# Patient Record
Sex: FEMALE | Race: WHITE | NOT HISPANIC OR LATINO | Employment: OTHER | URBAN - NONMETROPOLITAN AREA
[De-identification: names, ages, dates, MRNs, and addresses within clinical notes are randomized per-mention and may not be internally consistent; named-entity substitution may affect disease eponyms.]

---

## 2018-05-22 ENCOUNTER — HOSPITAL ENCOUNTER (OUTPATIENT)
Facility: HOSPITAL | Age: 68
Discharge: HOME | End: 2018-06-12
Attending: INTERNAL MEDICINE | Admitting: INTERNAL MEDICINE

## 2018-05-22 DIAGNOSIS — Z74.09 IMPAIRED FUNCTIONAL MOBILITY, BALANCE, GAIT, AND ENDURANCE: ICD-10-CM

## 2018-05-22 DIAGNOSIS — Z78.9 IMPAIRED MOBILITY AND ADLS: ICD-10-CM

## 2018-05-22 DIAGNOSIS — Z74.09 IMPAIRED MOBILITY AND ADLS: ICD-10-CM

## 2018-05-22 LAB
ALBUMIN SERPL-MCNC: 2.9 G/DL (ref 3.4–4.8)
ALBUMIN/GLOB SERPL: 0.7 G/DL (ref 1.1–1.8)
ALP SERPL-CCNC: 187 U/L (ref 38–126)
ALT SERPL W P-5'-P-CCNC: 72 U/L (ref 9–52)
ANION GAP SERPL CALCULATED.3IONS-SCNC: 8 MMOL/L (ref 5–15)
ANISOCYTOSIS BLD QL: ABNORMAL
AST SERPL-CCNC: 68 U/L (ref 14–36)
BILIRUB SERPL-MCNC: 0.5 MG/DL (ref 0.2–1.3)
BUN BLD-MCNC: 50 MG/DL (ref 7–21)
BUN/CREAT SERPL: 27.3 (ref 7–25)
CALCIUM SPEC-SCNC: 8.6 MG/DL (ref 8.4–10.2)
CHLORIDE SERPL-SCNC: 90 MMOL/L (ref 95–110)
CLUMPED PLATELETS: ABNORMAL
CO2 SERPL-SCNC: 37 MMOL/L (ref 22–31)
CREAT BLD-MCNC: 1.83 MG/DL (ref 0.5–1)
DEPRECATED RDW RBC AUTO: 55.8 FL (ref 36.4–46.3)
ERYTHROCYTE [DISTWIDTH] IN BLOOD BY AUTOMATED COUNT: 15.2 % (ref 11.5–14.5)
GFR SERPL CREATININE-BSD FRML MDRD: 27 ML/MIN/1.73 (ref 45–104)
GLOBULIN UR ELPH-MCNC: 4 GM/DL (ref 2.3–3.5)
GLUCOSE BLD-MCNC: 144 MG/DL (ref 60–100)
HCT VFR BLD AUTO: 31.7 % (ref 35–45)
HGB BLD-MCNC: 10.2 G/DL (ref 12–15.5)
HOLD SPECIMEN: NORMAL
HYPOCHROMIA BLD QL: ABNORMAL
LYMPHOCYTES # BLD MANUAL: 0.65 10*3/MM3 (ref 0.6–4.2)
LYMPHOCYTES NFR BLD MANUAL: 8 % (ref 0–12)
LYMPHOCYTES NFR BLD MANUAL: 8 % (ref 10–50)
MAGNESIUM SERPL-MCNC: 2.5 MG/DL (ref 1.6–2.3)
MCH RBC QN AUTO: 32.1 PG (ref 26.5–34)
MCHC RBC AUTO-ENTMCNC: 32.2 G/DL (ref 31.4–36)
MCV RBC AUTO: 99.7 FL (ref 80–98)
MONOCYTES # BLD AUTO: 0.65 10*3/MM3 (ref 0–0.9)
MYELOCYTES NFR BLD MANUAL: 2 % (ref 0–0)
NEUTROPHILS # BLD AUTO: 6.67 10*3/MM3 (ref 2–8.6)
NEUTROPHILS NFR BLD MANUAL: 78 % (ref 37–80)
NEUTS BAND NFR BLD MANUAL: 4 % (ref 0–5)
PLATELET # BLD AUTO: 53 10*3/MM3 (ref 150–450)
PMV BLD AUTO: 14.2 FL (ref 8–12)
POTASSIUM BLD-SCNC: 3.8 MMOL/L (ref 3.5–5.1)
PROT SERPL-MCNC: 6.9 G/DL (ref 6.3–8.6)
RBC # BLD AUTO: 3.18 10*6/MM3 (ref 3.77–5.16)
SMALL PLATELETS BLD QL SMEAR: ABNORMAL
SODIUM BLD-SCNC: 135 MMOL/L (ref 137–145)
WBC MORPH BLD: NORMAL
WBC NRBC COR # BLD: 8.13 10*3/MM3 (ref 3.2–9.8)
WHOLE BLOOD HOLD SPECIMEN: NORMAL

## 2018-05-22 PROCEDURE — 87040 BLOOD CULTURE FOR BACTERIA: CPT | Performed by: INTERNAL MEDICINE

## 2018-05-22 PROCEDURE — 25010000002 METHYLPREDNISOLONE PER 125 MG: Performed by: INTERNAL MEDICINE

## 2018-05-22 PROCEDURE — 80053 COMPREHEN METABOLIC PANEL: CPT | Performed by: INTERNAL MEDICINE

## 2018-05-22 PROCEDURE — 83735 ASSAY OF MAGNESIUM: CPT | Performed by: INTERNAL MEDICINE

## 2018-05-22 PROCEDURE — 85025 COMPLETE CBC W/AUTO DIFF WBC: CPT | Performed by: INTERNAL MEDICINE

## 2018-05-22 PROCEDURE — 87181 SC STD AGAR DILUTION PER AGT: CPT | Performed by: INTERNAL MEDICINE

## 2018-05-22 PROCEDURE — 85007 BL SMEAR W/DIFF WBC COUNT: CPT | Performed by: INTERNAL MEDICINE

## 2018-05-22 PROCEDURE — 87205 SMEAR GRAM STAIN: CPT | Performed by: INTERNAL MEDICINE

## 2018-05-22 PROCEDURE — 87070 CULTURE OTHR SPECIMN AEROBIC: CPT | Performed by: INTERNAL MEDICINE

## 2018-05-22 RX ORDER — IPRATROPIUM BROMIDE AND ALBUTEROL SULFATE 2.5; .5 MG/3ML; MG/3ML
3 SOLUTION RESPIRATORY (INHALATION)
Status: DISCONTINUED | OUTPATIENT
Start: 2018-05-22 | End: 2018-05-22

## 2018-05-22 RX ORDER — FUROSEMIDE 20 MG/1
20 TABLET ORAL DAILY
Status: DISCONTINUED | OUTPATIENT
Start: 2018-05-23 | End: 2018-06-09

## 2018-05-22 RX ORDER — DOCUSATE SODIUM 100 MG/1
200 CAPSULE, LIQUID FILLED ORAL 2 TIMES DAILY
Status: DISCONTINUED | OUTPATIENT
Start: 2018-05-22 | End: 2018-06-13 | Stop reason: HOSPADM

## 2018-05-22 RX ORDER — BUDESONIDE 0.5 MG/2ML
0.5 INHALANT ORAL
Status: DISCONTINUED | OUTPATIENT
Start: 2018-05-22 | End: 2018-06-13 | Stop reason: HOSPADM

## 2018-05-22 RX ORDER — ONDANSETRON 2 MG/ML
4 INJECTION INTRAMUSCULAR; INTRAVENOUS EVERY 6 HOURS PRN
Status: DISCONTINUED | OUTPATIENT
Start: 2018-05-22 | End: 2018-06-13 | Stop reason: HOSPADM

## 2018-05-22 RX ORDER — LEVOTHYROXINE SODIUM 0.1 MG/1
100 TABLET ORAL
Status: DISCONTINUED | OUTPATIENT
Start: 2018-05-23 | End: 2018-06-13 | Stop reason: HOSPADM

## 2018-05-22 RX ORDER — HYDROCODONE BITARTRATE AND ACETAMINOPHEN 7.5; 325 MG/1; MG/1
1 TABLET ORAL EVERY 6 HOURS PRN
Status: DISPENSED | OUTPATIENT
Start: 2018-05-22 | End: 2018-06-01

## 2018-05-22 RX ORDER — ALBUTEROL SULFATE 2.5 MG/3ML
2.5 SOLUTION RESPIRATORY (INHALATION)
Status: DISCONTINUED | OUTPATIENT
Start: 2018-05-22 | End: 2018-06-13 | Stop reason: HOSPADM

## 2018-05-22 RX ORDER — IPRATROPIUM BROMIDE AND ALBUTEROL SULFATE 2.5; .5 MG/3ML; MG/3ML
3 SOLUTION RESPIRATORY (INHALATION)
Status: DISCONTINUED | OUTPATIENT
Start: 2018-05-22 | End: 2018-06-13 | Stop reason: HOSPADM

## 2018-05-22 RX ORDER — CARVEDILOL 6.25 MG/1
6.25 TABLET ORAL 2 TIMES DAILY WITH MEALS
Status: DISCONTINUED | OUTPATIENT
Start: 2018-05-22 | End: 2018-06-13 | Stop reason: HOSPADM

## 2018-05-22 RX ORDER — FAMOTIDINE 20 MG/1
20 TABLET, FILM COATED ORAL 2 TIMES DAILY
Status: DISCONTINUED | OUTPATIENT
Start: 2018-05-22 | End: 2018-06-13 | Stop reason: HOSPADM

## 2018-05-22 RX ORDER — FUROSEMIDE 20 MG/1
20 TABLET ORAL DAILY
Status: DISCONTINUED | OUTPATIENT
Start: 2018-05-22 | End: 2018-05-22

## 2018-05-22 RX ORDER — SPIRONOLACTONE 25 MG/1
25 TABLET ORAL DAILY
Status: DISCONTINUED | OUTPATIENT
Start: 2018-05-23 | End: 2018-06-13 | Stop reason: HOSPADM

## 2018-05-22 RX ORDER — METHYLPREDNISOLONE SODIUM SUCCINATE 125 MG/2ML
60 INJECTION, POWDER, LYOPHILIZED, FOR SOLUTION INTRAMUSCULAR; INTRAVENOUS EVERY 6 HOURS
Status: DISCONTINUED | OUTPATIENT
Start: 2018-05-22 | End: 2018-05-26

## 2018-05-22 RX ORDER — HYDRALAZINE HYDROCHLORIDE 20 MG/ML
10 INJECTION INTRAMUSCULAR; INTRAVENOUS EVERY 4 HOURS PRN
Status: DISCONTINUED | OUTPATIENT
Start: 2018-05-22 | End: 2018-06-13 | Stop reason: HOSPADM

## 2018-05-23 LAB
ARTERIAL PATENCY WRIST A: ABNORMAL
ATMOSPHERIC PRESS: 749 MMHG
BASE EXCESS BLDA CALC-SCNC: 10.2 MMOL/L (ref 0–2)
BDY SITE: ABNORMAL
EPAP: 7
HCO3 BLDA-SCNC: 38.2 MMOL/L (ref 20–26)
HOROWITZ INDEX BLD+IHG-RTO: 30 %
IPAP: 14
Lab: ABNORMAL
MODALITY: ABNORMAL
PAW @ PEAK INSP FLOW SETTING VENT: 14 CMH2O
PCO2 BLDA: 62.7 MM HG (ref 35–45)
PH BLDA: 7.39 PH UNITS (ref 7.35–7.45)
PO2 BLDA: 78.5 MM HG (ref 83–108)
SAO2 % BLDCOA: 95.7 % (ref 94–99)
SET MECH RESP RATE: 18
VENTILATOR MODE: ABNORMAL

## 2018-05-23 PROCEDURE — 97162 PT EVAL MOD COMPLEX 30 MIN: CPT

## 2018-05-23 PROCEDURE — 97116 GAIT TRAINING THERAPY: CPT

## 2018-05-23 PROCEDURE — 97535 SELF CARE MNGMENT TRAINING: CPT

## 2018-05-23 PROCEDURE — 82803 BLOOD GASES ANY COMBINATION: CPT

## 2018-05-23 PROCEDURE — 36600 WITHDRAWAL OF ARTERIAL BLOOD: CPT

## 2018-05-23 PROCEDURE — 25010000002 METHYLPREDNISOLONE PER 125 MG: Performed by: INTERNAL MEDICINE

## 2018-05-23 PROCEDURE — 97166 OT EVAL MOD COMPLEX 45 MIN: CPT

## 2018-05-24 LAB
ALBUMIN SERPL-MCNC: 2.8 G/DL (ref 3.4–4.8)
ALBUMIN/GLOB SERPL: 0.7 G/DL (ref 1.1–1.8)
ALP SERPL-CCNC: 186 U/L (ref 38–126)
ALT SERPL W P-5'-P-CCNC: 67 U/L (ref 9–52)
ANION GAP SERPL CALCULATED.3IONS-SCNC: 7 MMOL/L (ref 5–15)
AST SERPL-CCNC: 54 U/L (ref 14–36)
BILIRUB SERPL-MCNC: 0.7 MG/DL (ref 0.2–1.3)
BUN BLD-MCNC: 61 MG/DL (ref 7–21)
BUN/CREAT SERPL: 37.4 (ref 7–25)
CALCIUM SPEC-SCNC: 8.6 MG/DL (ref 8.4–10.2)
CHLORIDE SERPL-SCNC: 94 MMOL/L (ref 95–110)
CO2 SERPL-SCNC: 37 MMOL/L (ref 22–31)
CREAT BLD-MCNC: 1.63 MG/DL (ref 0.5–1)
DEPRECATED RDW RBC AUTO: 52 FL (ref 36.4–46.3)
ERYTHROCYTE [DISTWIDTH] IN BLOOD BY AUTOMATED COUNT: 15.1 % (ref 11.5–14.5)
GFR SERPL CREATININE-BSD FRML MDRD: 31 ML/MIN/1.73 (ref 45–104)
GLOBULIN UR ELPH-MCNC: 3.9 GM/DL (ref 2.3–3.5)
GLUCOSE BLD-MCNC: 136 MG/DL (ref 60–100)
HCT VFR BLD AUTO: 30.9 % (ref 35–45)
HGB BLD-MCNC: 10.3 G/DL (ref 12–15.5)
MAGNESIUM SERPL-MCNC: 2.6 MG/DL (ref 1.6–2.3)
MCH RBC QN AUTO: 31.4 PG (ref 26.5–34)
MCHC RBC AUTO-ENTMCNC: 33.3 G/DL (ref 31.4–36)
MCV RBC AUTO: 94.2 FL (ref 80–98)
PLATELET # BLD AUTO: 65 10*3/MM3 (ref 150–450)
PMV BLD AUTO: 13.8 FL (ref 8–12)
POTASSIUM BLD-SCNC: 3.7 MMOL/L (ref 3.5–5.1)
PROT SERPL-MCNC: 6.7 G/DL (ref 6.3–8.6)
RBC # BLD AUTO: 3.28 10*6/MM3 (ref 3.77–5.16)
SODIUM BLD-SCNC: 138 MMOL/L (ref 137–145)
WBC NRBC COR # BLD: 8.97 10*3/MM3 (ref 3.2–9.8)

## 2018-05-24 PROCEDURE — 25010000002 HEPARIN LOCK FLUSH 10 UNIT/ML SOLUTION: Performed by: INTERNAL MEDICINE

## 2018-05-24 PROCEDURE — 83735 ASSAY OF MAGNESIUM: CPT | Performed by: INTERNAL MEDICINE

## 2018-05-24 PROCEDURE — 97110 THERAPEUTIC EXERCISES: CPT

## 2018-05-24 PROCEDURE — 97530 THERAPEUTIC ACTIVITIES: CPT

## 2018-05-24 PROCEDURE — 25010000002 METHYLPREDNISOLONE PER 125 MG: Performed by: INTERNAL MEDICINE

## 2018-05-24 PROCEDURE — 97116 GAIT TRAINING THERAPY: CPT

## 2018-05-24 PROCEDURE — 85027 COMPLETE CBC AUTOMATED: CPT | Performed by: INTERNAL MEDICINE

## 2018-05-24 PROCEDURE — 80053 COMPREHEN METABOLIC PANEL: CPT | Performed by: INTERNAL MEDICINE

## 2018-05-24 RX ORDER — SODIUM CHLORIDE 0.9 % (FLUSH) 0.9 %
10 SYRINGE (ML) INJECTION EVERY 8 HOURS SCHEDULED
Status: DISCONTINUED | OUTPATIENT
Start: 2018-05-24 | End: 2018-06-13 | Stop reason: HOSPADM

## 2018-05-24 RX ORDER — HEPARIN SODIUM,PORCINE 10 UNIT/ML
30 VIAL (ML) INTRAVENOUS EVERY 8 HOURS SCHEDULED
Status: DISCONTINUED | OUTPATIENT
Start: 2018-05-24 | End: 2018-06-13 | Stop reason: HOSPADM

## 2018-05-24 RX ORDER — SODIUM CHLORIDE 0.9 % (FLUSH) 0.9 %
10 SYRINGE (ML) INJECTION AS NEEDED
Status: DISCONTINUED | OUTPATIENT
Start: 2018-05-24 | End: 2018-06-13 | Stop reason: HOSPADM

## 2018-05-25 LAB
BACTERIA SPEC RESP CULT: ABNORMAL
BACTERIA SPEC RESP CULT: ABNORMAL
GRAM STN SPEC: ABNORMAL

## 2018-05-25 PROCEDURE — 97530 THERAPEUTIC ACTIVITIES: CPT

## 2018-05-25 PROCEDURE — 25010000002 HEPARIN LOCK FLUSH 10 UNIT/ML SOLUTION: Performed by: INTERNAL MEDICINE

## 2018-05-25 PROCEDURE — 97116 GAIT TRAINING THERAPY: CPT

## 2018-05-25 PROCEDURE — 93005 ELECTROCARDIOGRAM TRACING: CPT | Performed by: INTERNAL MEDICINE

## 2018-05-25 PROCEDURE — 25010000002 CEFTRIAXONE: Performed by: INTERNAL MEDICINE

## 2018-05-25 PROCEDURE — 97535 SELF CARE MNGMENT TRAINING: CPT

## 2018-05-25 PROCEDURE — 63710000001 PREDNISONE PER 1 MG: Performed by: INTERNAL MEDICINE

## 2018-05-25 PROCEDURE — 93010 ELECTROCARDIOGRAM REPORT: CPT | Performed by: INTERNAL MEDICINE

## 2018-05-25 PROCEDURE — 25010000002 METHYLPREDNISOLONE PER 125 MG: Performed by: INTERNAL MEDICINE

## 2018-05-25 RX ORDER — AMIODARONE HYDROCHLORIDE 200 MG/1
200 TABLET ORAL 2 TIMES DAILY
Status: DISCONTINUED | OUTPATIENT
Start: 2018-05-26 | End: 2018-06-13 | Stop reason: HOSPADM

## 2018-05-25 RX ORDER — PREDNISONE 20 MG/1
60 TABLET ORAL DAILY
Status: DISCONTINUED | OUTPATIENT
Start: 2018-05-27 | End: 2018-06-04

## 2018-05-25 RX ORDER — PREDNISONE 20 MG/1
60 TABLET ORAL 2 TIMES DAILY WITH MEALS
Status: DISPENSED | OUTPATIENT
Start: 2018-05-25 | End: 2018-05-27

## 2018-05-25 RX ORDER — SODIUM CHLORIDE 9 MG/ML
INJECTION, SOLUTION INTRAVENOUS
Status: DISPENSED
Start: 2018-05-25 | End: 2018-05-26

## 2018-05-26 LAB
ALBUMIN SERPL-MCNC: 2.9 G/DL (ref 3.4–4.8)
ALBUMIN/GLOB SERPL: 0.7 G/DL (ref 1.1–1.8)
ALP SERPL-CCNC: 174 U/L (ref 38–126)
ALT SERPL W P-5'-P-CCNC: 74 U/L (ref 9–52)
ANION GAP SERPL CALCULATED.3IONS-SCNC: 8 MMOL/L (ref 5–15)
AST SERPL-CCNC: 63 U/L (ref 14–36)
BILIRUB SERPL-MCNC: 0.7 MG/DL (ref 0.2–1.3)
BUN BLD-MCNC: 75 MG/DL (ref 7–21)
BUN/CREAT SERPL: 43.1 (ref 7–25)
CALCIUM SPEC-SCNC: 8.4 MG/DL (ref 8.4–10.2)
CHLORIDE SERPL-SCNC: 91 MMOL/L (ref 95–110)
CO2 SERPL-SCNC: 38 MMOL/L (ref 22–31)
CREAT BLD-MCNC: 1.74 MG/DL (ref 0.5–1)
DEPRECATED RDW RBC AUTO: 53.6 FL (ref 36.4–46.3)
ERYTHROCYTE [DISTWIDTH] IN BLOOD BY AUTOMATED COUNT: 15.6 % (ref 11.5–14.5)
GFR SERPL CREATININE-BSD FRML MDRD: 29 ML/MIN/1.73 (ref 45–104)
GLOBULIN UR ELPH-MCNC: 3.9 GM/DL (ref 2.3–3.5)
GLUCOSE BLD-MCNC: 121 MG/DL (ref 60–100)
HCT VFR BLD AUTO: 32.6 % (ref 35–45)
HGB BLD-MCNC: 10.9 G/DL (ref 12–15.5)
MAGNESIUM SERPL-MCNC: 2.7 MG/DL (ref 1.6–2.3)
MCH RBC QN AUTO: 31.5 PG (ref 26.5–34)
MCHC RBC AUTO-ENTMCNC: 33.4 G/DL (ref 31.4–36)
MCV RBC AUTO: 94.2 FL (ref 80–98)
PLATELET # BLD AUTO: 62 10*3/MM3 (ref 150–450)
PMV BLD AUTO: 12.9 FL (ref 8–12)
POTASSIUM BLD-SCNC: 4.1 MMOL/L (ref 3.5–5.1)
PROT SERPL-MCNC: 6.8 G/DL (ref 6.3–8.6)
RBC # BLD AUTO: 3.46 10*6/MM3 (ref 3.77–5.16)
SODIUM BLD-SCNC: 137 MMOL/L (ref 137–145)
WBC NRBC COR # BLD: 11.8 10*3/MM3 (ref 3.2–9.8)

## 2018-05-26 PROCEDURE — 80053 COMPREHEN METABOLIC PANEL: CPT | Performed by: INTERNAL MEDICINE

## 2018-05-26 PROCEDURE — 25010000002 CEFTRIAXONE: Performed by: INTERNAL MEDICINE

## 2018-05-26 PROCEDURE — 25010000002 HEPARIN LOCK FLUSH 10 UNIT/ML SOLUTION: Performed by: INTERNAL MEDICINE

## 2018-05-26 PROCEDURE — 85027 COMPLETE CBC AUTOMATED: CPT | Performed by: INTERNAL MEDICINE

## 2018-05-26 PROCEDURE — 83735 ASSAY OF MAGNESIUM: CPT | Performed by: INTERNAL MEDICINE

## 2018-05-26 PROCEDURE — 63710000001 PREDNISONE PER 1 MG: Performed by: INTERNAL MEDICINE

## 2018-05-26 PROCEDURE — 25010000002 METHYLPREDNISOLONE PER 125 MG: Performed by: INTERNAL MEDICINE

## 2018-05-26 RX ORDER — SODIUM CHLORIDE 9 MG/ML
INJECTION, SOLUTION INTRAVENOUS
Status: DISPENSED
Start: 2018-05-26 | End: 2018-05-27

## 2018-05-27 LAB
BACTERIA SPEC AEROBE CULT: NORMAL
BACTERIA SPEC AEROBE CULT: NORMAL

## 2018-05-27 PROCEDURE — 97116 GAIT TRAINING THERAPY: CPT

## 2018-05-27 PROCEDURE — 97535 SELF CARE MNGMENT TRAINING: CPT

## 2018-05-27 PROCEDURE — 97110 THERAPEUTIC EXERCISES: CPT

## 2018-05-27 PROCEDURE — 25010000002 HEPARIN LOCK FLUSH 10 UNIT/ML SOLUTION: Performed by: INTERNAL MEDICINE

## 2018-05-27 PROCEDURE — 63710000001 PREDNISONE PER 1 MG: Performed by: INTERNAL MEDICINE

## 2018-05-27 PROCEDURE — 97530 THERAPEUTIC ACTIVITIES: CPT

## 2018-05-27 PROCEDURE — 25010000002 CEFTRIAXONE: Performed by: INTERNAL MEDICINE

## 2018-05-27 RX ORDER — SODIUM CHLORIDE 9 MG/ML
INJECTION, SOLUTION INTRAVENOUS
Status: DISPENSED
Start: 2018-05-27 | End: 2018-05-28

## 2018-05-28 LAB
ALBUMIN SERPL-MCNC: 2.7 G/DL (ref 3.4–4.8)
ALBUMIN/GLOB SERPL: 0.7 G/DL (ref 1.1–1.8)
ALP SERPL-CCNC: 158 U/L (ref 38–126)
ALT SERPL W P-5'-P-CCNC: 74 U/L (ref 9–52)
ANION GAP SERPL CALCULATED.3IONS-SCNC: 7 MMOL/L (ref 5–15)
AST SERPL-CCNC: 64 U/L (ref 14–36)
BILIRUB SERPL-MCNC: 0.7 MG/DL (ref 0.2–1.3)
BUN BLD-MCNC: 80 MG/DL (ref 7–21)
BUN/CREAT SERPL: 50 (ref 7–25)
CALCIUM SPEC-SCNC: 8.4 MG/DL (ref 8.4–10.2)
CHLORIDE SERPL-SCNC: 95 MMOL/L (ref 95–110)
CO2 SERPL-SCNC: 37 MMOL/L (ref 22–31)
CREAT BLD-MCNC: 1.6 MG/DL (ref 0.5–1)
DEPRECATED RDW RBC AUTO: 54 FL (ref 36.4–46.3)
ERYTHROCYTE [DISTWIDTH] IN BLOOD BY AUTOMATED COUNT: 15.7 % (ref 11.5–14.5)
GFR SERPL CREATININE-BSD FRML MDRD: 32 ML/MIN/1.73 (ref 45–104)
GLOBULIN UR ELPH-MCNC: 3.8 GM/DL (ref 2.3–3.5)
GLUCOSE BLD-MCNC: 144 MG/DL (ref 60–100)
HCT VFR BLD AUTO: 31.4 % (ref 35–45)
HGB BLD-MCNC: 10.5 G/DL (ref 12–15.5)
MAGNESIUM SERPL-MCNC: 2.6 MG/DL (ref 1.6–2.3)
MCH RBC QN AUTO: 31.4 PG (ref 26.5–34)
MCHC RBC AUTO-ENTMCNC: 33.4 G/DL (ref 31.4–36)
MCV RBC AUTO: 94 FL (ref 80–98)
PLATELET # BLD AUTO: 50 10*3/MM3 (ref 150–450)
PMV BLD AUTO: 13.6 FL (ref 8–12)
POTASSIUM BLD-SCNC: 4.7 MMOL/L (ref 3.5–5.1)
PROT SERPL-MCNC: 6.5 G/DL (ref 6.3–8.6)
RBC # BLD AUTO: 3.34 10*6/MM3 (ref 3.77–5.16)
SODIUM BLD-SCNC: 139 MMOL/L (ref 137–145)
WBC NRBC COR # BLD: 9.89 10*3/MM3 (ref 3.2–9.8)

## 2018-05-28 PROCEDURE — 25010000002 HYDRALAZINE PER 20 MG: Performed by: INTERNAL MEDICINE

## 2018-05-28 PROCEDURE — 63710000001 PREDNISONE PER 1 MG: Performed by: INTERNAL MEDICINE

## 2018-05-28 PROCEDURE — 83735 ASSAY OF MAGNESIUM: CPT | Performed by: INTERNAL MEDICINE

## 2018-05-28 PROCEDURE — 85027 COMPLETE CBC AUTOMATED: CPT | Performed by: INTERNAL MEDICINE

## 2018-05-28 PROCEDURE — 80053 COMPREHEN METABOLIC PANEL: CPT | Performed by: INTERNAL MEDICINE

## 2018-05-28 PROCEDURE — 25010000002 HEPARIN LOCK FLUSH 10 UNIT/ML SOLUTION: Performed by: INTERNAL MEDICINE

## 2018-05-28 PROCEDURE — 25010000002 CEFTRIAXONE: Performed by: INTERNAL MEDICINE

## 2018-05-29 PROCEDURE — 97116 GAIT TRAINING THERAPY: CPT

## 2018-05-29 PROCEDURE — 63710000001 PREDNISONE PER 1 MG: Performed by: INTERNAL MEDICINE

## 2018-05-29 PROCEDURE — 97530 THERAPEUTIC ACTIVITIES: CPT

## 2018-05-29 PROCEDURE — 25010000002 HEPARIN LOCK FLUSH 10 UNIT/ML SOLUTION: Performed by: INTERNAL MEDICINE

## 2018-05-29 PROCEDURE — 25010000002 CEFTRIAXONE: Performed by: INTERNAL MEDICINE

## 2018-05-29 PROCEDURE — 97110 THERAPEUTIC EXERCISES: CPT

## 2018-05-30 LAB
ALBUMIN SERPL-MCNC: 2.6 G/DL (ref 3.4–4.8)
ALBUMIN/GLOB SERPL: 0.7 G/DL (ref 1.1–1.8)
ALP SERPL-CCNC: 178 U/L (ref 38–126)
ALT SERPL W P-5'-P-CCNC: 84 U/L (ref 9–52)
ANION GAP SERPL CALCULATED.3IONS-SCNC: 4 MMOL/L (ref 5–15)
AST SERPL-CCNC: 77 U/L (ref 14–36)
BILIRUB SERPL-MCNC: 0.7 MG/DL (ref 0.2–1.3)
BUN BLD-MCNC: 70 MG/DL (ref 7–21)
BUN/CREAT SERPL: 44.9 (ref 7–25)
CALCIUM SPEC-SCNC: 8.2 MG/DL (ref 8.4–10.2)
CHLORIDE SERPL-SCNC: 98 MMOL/L (ref 95–110)
CO2 SERPL-SCNC: 36 MMOL/L (ref 22–31)
CREAT BLD-MCNC: 1.56 MG/DL (ref 0.5–1)
DEPRECATED RDW RBC AUTO: 54.3 FL (ref 36.4–46.3)
ERYTHROCYTE [DISTWIDTH] IN BLOOD BY AUTOMATED COUNT: 15.6 % (ref 11.5–14.5)
GFR SERPL CREATININE-BSD FRML MDRD: 33 ML/MIN/1.73 (ref 45–104)
GLOBULIN UR ELPH-MCNC: 3.9 GM/DL (ref 2.3–3.5)
GLUCOSE BLD-MCNC: 133 MG/DL (ref 60–100)
HCT VFR BLD AUTO: 31.7 % (ref 35–45)
HGB BLD-MCNC: 10.5 G/DL (ref 12–15.5)
MAGNESIUM SERPL-MCNC: 2.5 MG/DL (ref 1.6–2.3)
MCH RBC QN AUTO: 31.4 PG (ref 26.5–34)
MCHC RBC AUTO-ENTMCNC: 33.1 G/DL (ref 31.4–36)
MCV RBC AUTO: 94.9 FL (ref 80–98)
PLATELET # BLD AUTO: 48 10*3/MM3 (ref 150–450)
PMV BLD AUTO: 13.8 FL (ref 8–12)
POTASSIUM BLD-SCNC: 4.6 MMOL/L (ref 3.5–5.1)
PROT SERPL-MCNC: 6.5 G/DL (ref 6.3–8.6)
RBC # BLD AUTO: 3.34 10*6/MM3 (ref 3.77–5.16)
RBC MORPH BLD: NORMAL
SMALL PLATELETS BLD QL SMEAR: NORMAL
SODIUM BLD-SCNC: 138 MMOL/L (ref 137–145)
WBC MORPH BLD: NORMAL
WBC NRBC COR # BLD: 10.33 10*3/MM3 (ref 3.2–9.8)

## 2018-05-30 PROCEDURE — 25010000002 CEFTRIAXONE: Performed by: INTERNAL MEDICINE

## 2018-05-30 PROCEDURE — 85027 COMPLETE CBC AUTOMATED: CPT | Performed by: INTERNAL MEDICINE

## 2018-05-30 PROCEDURE — 97535 SELF CARE MNGMENT TRAINING: CPT

## 2018-05-30 PROCEDURE — 25010000002 HEPARIN LOCK FLUSH 10 UNIT/ML SOLUTION: Performed by: INTERNAL MEDICINE

## 2018-05-30 PROCEDURE — 80053 COMPREHEN METABOLIC PANEL: CPT | Performed by: INTERNAL MEDICINE

## 2018-05-30 PROCEDURE — 97530 THERAPEUTIC ACTIVITIES: CPT

## 2018-05-30 PROCEDURE — 83735 ASSAY OF MAGNESIUM: CPT | Performed by: INTERNAL MEDICINE

## 2018-05-30 PROCEDURE — 85007 BL SMEAR W/DIFF WBC COUNT: CPT | Performed by: INTERNAL MEDICINE

## 2018-05-30 PROCEDURE — 63710000001 PREDNISONE PER 1 MG: Performed by: INTERNAL MEDICINE

## 2018-05-30 PROCEDURE — 97116 GAIT TRAINING THERAPY: CPT

## 2018-05-30 RX ORDER — SODIUM CHLORIDE 9 MG/ML
INJECTION, SOLUTION INTRAVENOUS
Status: DISPENSED
Start: 2018-05-30 | End: 2018-05-31

## 2018-05-31 PROCEDURE — 63710000001 PREDNISONE PER 1 MG: Performed by: INTERNAL MEDICINE

## 2018-05-31 PROCEDURE — 97116 GAIT TRAINING THERAPY: CPT

## 2018-05-31 PROCEDURE — 25010000002 CEFTRIAXONE: Performed by: INTERNAL MEDICINE

## 2018-05-31 PROCEDURE — 97535 SELF CARE MNGMENT TRAINING: CPT

## 2018-05-31 PROCEDURE — 97110 THERAPEUTIC EXERCISES: CPT

## 2018-05-31 PROCEDURE — 25010000002 HEPARIN LOCK FLUSH 10 UNIT/ML SOLUTION: Performed by: INTERNAL MEDICINE

## 2018-06-01 LAB
ALBUMIN SERPL-MCNC: 2.6 G/DL (ref 3.4–4.8)
ALBUMIN/GLOB SERPL: 0.7 G/DL (ref 1.1–1.8)
ALP SERPL-CCNC: 176 U/L (ref 38–126)
ALT SERPL W P-5'-P-CCNC: 79 U/L (ref 9–52)
ANION GAP SERPL CALCULATED.3IONS-SCNC: 5 MMOL/L (ref 5–15)
AST SERPL-CCNC: 68 U/L (ref 14–36)
BILIRUB SERPL-MCNC: 0.9 MG/DL (ref 0.2–1.3)
BUN BLD-MCNC: 74 MG/DL (ref 7–21)
BUN/CREAT SERPL: 50.7 (ref 7–25)
CALCIUM SPEC-SCNC: 8.2 MG/DL (ref 8.4–10.2)
CHLORIDE SERPL-SCNC: 97 MMOL/L (ref 95–110)
CO2 SERPL-SCNC: 35 MMOL/L (ref 22–31)
CREAT BLD-MCNC: 1.46 MG/DL (ref 0.5–1)
DEPRECATED RDW RBC AUTO: 54.2 FL (ref 36.4–46.3)
ERYTHROCYTE [DISTWIDTH] IN BLOOD BY AUTOMATED COUNT: 15.7 % (ref 11.5–14.5)
GFR SERPL CREATININE-BSD FRML MDRD: 36 ML/MIN/1.73 (ref 45–104)
GLOBULIN UR ELPH-MCNC: 3.5 GM/DL (ref 2.3–3.5)
GLUCOSE BLD-MCNC: 108 MG/DL (ref 60–100)
HCT VFR BLD AUTO: 30.6 % (ref 35–45)
HGB BLD-MCNC: 10.1 G/DL (ref 12–15.5)
MAGNESIUM SERPL-MCNC: 2.5 MG/DL (ref 1.6–2.3)
MCH RBC QN AUTO: 31.2 PG (ref 26.5–34)
MCHC RBC AUTO-ENTMCNC: 33 G/DL (ref 31.4–36)
MCV RBC AUTO: 94.4 FL (ref 80–98)
PLATELET # BLD AUTO: 45 10*3/MM3 (ref 150–450)
PMV BLD AUTO: 13.7 FL (ref 8–12)
POTASSIUM BLD-SCNC: 4.8 MMOL/L (ref 3.5–5.1)
PROT SERPL-MCNC: 6.1 G/DL (ref 6.3–8.6)
RBC # BLD AUTO: 3.24 10*6/MM3 (ref 3.77–5.16)
SODIUM BLD-SCNC: 137 MMOL/L (ref 137–145)
WBC NRBC COR # BLD: 9.26 10*3/MM3 (ref 3.2–9.8)

## 2018-06-01 PROCEDURE — 97116 GAIT TRAINING THERAPY: CPT

## 2018-06-01 PROCEDURE — 83735 ASSAY OF MAGNESIUM: CPT | Performed by: INTERNAL MEDICINE

## 2018-06-01 PROCEDURE — 25010000002 HEPARIN LOCK FLUSH 10 UNIT/ML SOLUTION: Performed by: INTERNAL MEDICINE

## 2018-06-01 PROCEDURE — 63710000001 PREDNISONE PER 1 MG: Performed by: INTERNAL MEDICINE

## 2018-06-01 PROCEDURE — 97535 SELF CARE MNGMENT TRAINING: CPT

## 2018-06-01 PROCEDURE — 85027 COMPLETE CBC AUTOMATED: CPT | Performed by: INTERNAL MEDICINE

## 2018-06-01 PROCEDURE — 80053 COMPREHEN METABOLIC PANEL: CPT | Performed by: INTERNAL MEDICINE

## 2018-06-02 PROCEDURE — 25010000002 HEPARIN LOCK FLUSH 10 UNIT/ML SOLUTION: Performed by: INTERNAL MEDICINE

## 2018-06-02 PROCEDURE — 63710000001 PREDNISONE PER 1 MG: Performed by: INTERNAL MEDICINE

## 2018-06-03 LAB
ALBUMIN SERPL-MCNC: 2.8 G/DL (ref 3.4–4.8)
ALBUMIN/GLOB SERPL: 0.7 G/DL (ref 1.1–1.8)
ALP SERPL-CCNC: 204 U/L (ref 38–126)
ALT SERPL W P-5'-P-CCNC: 105 U/L (ref 9–52)
ANION GAP SERPL CALCULATED.3IONS-SCNC: 5 MMOL/L (ref 5–15)
AST SERPL-CCNC: 95 U/L (ref 14–36)
BILIRUB SERPL-MCNC: 1 MG/DL (ref 0.2–1.3)
BUN BLD-MCNC: 72 MG/DL (ref 7–21)
BUN/CREAT SERPL: 46.8 (ref 7–25)
CALCIUM SPEC-SCNC: 8.7 MG/DL (ref 8.4–10.2)
CHLORIDE SERPL-SCNC: 98 MMOL/L (ref 95–110)
CO2 SERPL-SCNC: 33 MMOL/L (ref 22–31)
CREAT BLD-MCNC: 1.54 MG/DL (ref 0.5–1)
DEPRECATED RDW RBC AUTO: 54.5 FL (ref 36.4–46.3)
ERYTHROCYTE [DISTWIDTH] IN BLOOD BY AUTOMATED COUNT: 15.8 % (ref 11.5–14.5)
GFR SERPL CREATININE-BSD FRML MDRD: 33 ML/MIN/1.73 (ref 45–104)
GLOBULIN UR ELPH-MCNC: 3.8 GM/DL (ref 2.3–3.5)
GLUCOSE BLD-MCNC: 141 MG/DL (ref 60–100)
HCT VFR BLD AUTO: 33 % (ref 35–45)
HGB BLD-MCNC: 10.9 G/DL (ref 12–15.5)
MAGNESIUM SERPL-MCNC: 2.5 MG/DL (ref 1.6–2.3)
MCH RBC QN AUTO: 31.6 PG (ref 26.5–34)
MCHC RBC AUTO-ENTMCNC: 33 G/DL (ref 31.4–36)
MCV RBC AUTO: 95.7 FL (ref 80–98)
PLATELET # BLD AUTO: 49 10*3/MM3 (ref 150–450)
PMV BLD AUTO: 13.4 FL (ref 8–12)
POTASSIUM BLD-SCNC: 5.4 MMOL/L (ref 3.5–5.1)
PROT SERPL-MCNC: 6.6 G/DL (ref 6.3–8.6)
RBC # BLD AUTO: 3.45 10*6/MM3 (ref 3.77–5.16)
SODIUM BLD-SCNC: 136 MMOL/L (ref 137–145)
WBC NRBC COR # BLD: 14.49 10*3/MM3 (ref 3.2–9.8)

## 2018-06-03 PROCEDURE — 93010 ELECTROCARDIOGRAM REPORT: CPT | Performed by: INTERNAL MEDICINE

## 2018-06-03 PROCEDURE — 83735 ASSAY OF MAGNESIUM: CPT | Performed by: INTERNAL MEDICINE

## 2018-06-03 PROCEDURE — 93005 ELECTROCARDIOGRAM TRACING: CPT | Performed by: INTERNAL MEDICINE

## 2018-06-03 PROCEDURE — 85027 COMPLETE CBC AUTOMATED: CPT | Performed by: INTERNAL MEDICINE

## 2018-06-03 PROCEDURE — 25010000002 HEPARIN LOCK FLUSH 10 UNIT/ML SOLUTION: Performed by: INTERNAL MEDICINE

## 2018-06-03 PROCEDURE — 63710000001 PREDNISONE PER 1 MG: Performed by: INTERNAL MEDICINE

## 2018-06-03 PROCEDURE — 80053 COMPREHEN METABOLIC PANEL: CPT | Performed by: INTERNAL MEDICINE

## 2018-06-03 RX ORDER — SODIUM POLYSTYRENE SULFONATE 15 G/60ML
30 SUSPENSION ORAL; RECTAL ONCE
Status: DISCONTINUED | OUTPATIENT
Start: 2018-06-03 | End: 2018-06-08

## 2018-06-04 ENCOUNTER — OUTSIDE FACILITY SERVICE (OUTPATIENT)
Dept: ONCOLOGY | Facility: CLINIC | Age: 68
End: 2018-06-04

## 2018-06-04 LAB
ALBUMIN SERPL-MCNC: 2.8 G/DL (ref 3.4–4.8)
ALBUMIN/GLOB SERPL: 0.8 G/DL (ref 1.1–1.8)
ALP SERPL-CCNC: 200 U/L (ref 38–126)
ALT SERPL W P-5'-P-CCNC: 108 U/L (ref 9–52)
ANION GAP SERPL CALCULATED.3IONS-SCNC: 4 MMOL/L (ref 5–15)
AST SERPL-CCNC: 82 U/L (ref 14–36)
BASOPHILS # BLD AUTO: 0.01 10*3/MM3 (ref 0–0.2)
BASOPHILS NFR BLD AUTO: 0.1 % (ref 0–2)
BILIRUB SERPL-MCNC: 0.9 MG/DL (ref 0.2–1.3)
BUN BLD-MCNC: 66 MG/DL (ref 7–21)
BUN/CREAT SERPL: 43.7 (ref 7–25)
CALCIUM SPEC-SCNC: 8.4 MG/DL (ref 8.4–10.2)
CHLORIDE SERPL-SCNC: 97 MMOL/L (ref 95–110)
CO2 SERPL-SCNC: 34 MMOL/L (ref 22–31)
CREAT BLD-MCNC: 1.51 MG/DL (ref 0.5–1)
DEPRECATED RDW RBC AUTO: 54.4 FL (ref 36.4–46.3)
EOSINOPHIL # BLD AUTO: 0 10*3/MM3 (ref 0–0.7)
EOSINOPHIL NFR BLD AUTO: 0 % (ref 0–7)
ERYTHROCYTE [DISTWIDTH] IN BLOOD BY AUTOMATED COUNT: 15.8 % (ref 11.5–14.5)
GFR SERPL CREATININE-BSD FRML MDRD: 34 ML/MIN/1.73 (ref 45–104)
GLOBULIN UR ELPH-MCNC: 3.6 GM/DL (ref 2.3–3.5)
GLUCOSE BLD-MCNC: 131 MG/DL (ref 60–100)
HCT VFR BLD AUTO: 32.2 % (ref 35–45)
HGB BLD-MCNC: 10.7 G/DL (ref 12–15.5)
IMM GRANULOCYTES # BLD: 0.14 10*3/MM3 (ref 0–0.02)
IMM GRANULOCYTES NFR BLD: 1.1 % (ref 0–0.5)
LYMPHOCYTES # BLD AUTO: 0.46 10*3/MM3 (ref 0.6–4.2)
LYMPHOCYTES NFR BLD AUTO: 3.6 % (ref 10–50)
MAGNESIUM SERPL-MCNC: 2.4 MG/DL (ref 1.6–2.3)
MCH RBC QN AUTO: 31.8 PG (ref 26.5–34)
MCHC RBC AUTO-ENTMCNC: 33.2 G/DL (ref 31.4–36)
MCV RBC AUTO: 95.8 FL (ref 80–98)
MONOCYTES # BLD AUTO: 0.73 10*3/MM3 (ref 0–0.9)
MONOCYTES NFR BLD AUTO: 5.7 % (ref 0–12)
NEUTROPHILS # BLD AUTO: 11.56 10*3/MM3 (ref 2–8.6)
NEUTROPHILS NFR BLD AUTO: 89.5 % (ref 37–80)
PLATELET # BLD AUTO: 32 10*3/MM3 (ref 150–450)
PMV BLD AUTO: 13.9 FL (ref 8–12)
POTASSIUM BLD-SCNC: 4.9 MMOL/L (ref 3.5–5.1)
PROT SERPL-MCNC: 6.4 G/DL (ref 6.3–8.6)
RBC # BLD AUTO: 3.36 10*6/MM3 (ref 3.77–5.16)
SODIUM BLD-SCNC: 135 MMOL/L (ref 137–145)
WBC NRBC COR # BLD: 12.9 10*3/MM3 (ref 3.2–9.8)

## 2018-06-04 PROCEDURE — 80053 COMPREHEN METABOLIC PANEL: CPT | Performed by: INTERNAL MEDICINE

## 2018-06-04 PROCEDURE — 63710000001 PREDNISONE PER 1 MG: Performed by: INTERNAL MEDICINE

## 2018-06-04 PROCEDURE — 99222 1ST HOSP IP/OBS MODERATE 55: CPT | Performed by: INTERNAL MEDICINE

## 2018-06-04 PROCEDURE — 85025 COMPLETE CBC W/AUTO DIFF WBC: CPT | Performed by: INTERNAL MEDICINE

## 2018-06-04 PROCEDURE — 83735 ASSAY OF MAGNESIUM: CPT | Performed by: INTERNAL MEDICINE

## 2018-06-04 RX ORDER — PREDNISONE 20 MG/1
40 TABLET ORAL DAILY
Status: DISCONTINUED | OUTPATIENT
Start: 2018-06-05 | End: 2018-06-13 | Stop reason: HOSPADM

## 2018-06-04 NOTE — SIGNIFICANT NOTE
06/04/18 1139   Rehab Treatment   Discipline occupational therapy assistant   Reason Treatment Not Performed other (see comments)  (Pt platelets 32)

## 2018-06-04 NOTE — SIGNIFICANT NOTE
06/04/18 0958   Rehab Treatment   Discipline physical therapy assistant   Reason Treatment Not Performed unable to treat, medical status change  (pt. platelets are 32 this am and treatment deferred)

## 2018-06-05 ENCOUNTER — OUTSIDE FACILITY SERVICE (OUTPATIENT)
Dept: ONCOLOGY | Facility: CLINIC | Age: 68
End: 2018-06-05

## 2018-06-05 ENCOUNTER — APPOINTMENT (OUTPATIENT)
Dept: ULTRASOUND IMAGING | Facility: HOSPITAL | Age: 68
End: 2018-06-05

## 2018-06-05 LAB
25(OH)D3 SERPL-MCNC: 21.8 NG/ML (ref 30–100)
ALBUMIN SERPL-MCNC: 2.8 G/DL (ref 3.4–4.8)
ALBUMIN/GLOB SERPL: 0.8 G/DL (ref 1.1–1.8)
ALP SERPL-CCNC: 216 U/L (ref 38–126)
ALT SERPL W P-5'-P-CCNC: 109 U/L (ref 9–52)
ANION GAP SERPL CALCULATED.3IONS-SCNC: 5 MMOL/L (ref 5–15)
AST SERPL-CCNC: 75 U/L (ref 14–36)
BASOPHILS # BLD AUTO: 0.01 10*3/MM3 (ref 0–0.2)
BASOPHILS NFR BLD AUTO: 0.1 % (ref 0–2)
BILIRUB SERPL-MCNC: 1.1 MG/DL (ref 0.2–1.3)
BUN BLD-MCNC: 66 MG/DL (ref 7–21)
BUN/CREAT SERPL: 40.5 (ref 7–25)
CALCIUM SPEC-SCNC: 8.3 MG/DL (ref 8.4–10.2)
CHLORIDE SERPL-SCNC: 97 MMOL/L (ref 95–110)
CO2 SERPL-SCNC: 34 MMOL/L (ref 22–31)
CREAT BLD-MCNC: 1.63 MG/DL (ref 0.5–1)
DEPRECATED RDW RBC AUTO: 55.1 FL (ref 36.4–46.3)
DEPRECATED RDW RBC AUTO: 57.4 FL (ref 36.4–46.3)
EOSINOPHIL # BLD AUTO: 0 10*3/MM3 (ref 0–0.7)
EOSINOPHIL NFR BLD AUTO: 0 % (ref 0–7)
ERYTHROCYTE [DISTWIDTH] IN BLOOD BY AUTOMATED COUNT: 16 % (ref 11.5–14.5)
ERYTHROCYTE [DISTWIDTH] IN BLOOD BY AUTOMATED COUNT: 16.4 % (ref 11.5–14.5)
FERRITIN SERPL-MCNC: 48.7 NG/ML (ref 11.1–264)
FOLATE SERPL-MCNC: 7.95 NG/ML (ref 2.76–21)
GFR SERPL CREATININE-BSD FRML MDRD: 31 ML/MIN/1.73 (ref 45–104)
GLOBULIN UR ELPH-MCNC: 3.7 GM/DL (ref 2.3–3.5)
GLUCOSE BLD-MCNC: 144 MG/DL (ref 60–100)
HCT VFR BLD AUTO: 33.3 % (ref 35–45)
HCT VFR BLD AUTO: 33.3 % (ref 35–45)
HGB BLD-MCNC: 10.8 G/DL (ref 12–15.5)
HGB BLD-MCNC: 11 G/DL (ref 12–15.5)
IMM GRANULOCYTES # BLD: 0.15 10*3/MM3 (ref 0–0.02)
IMM GRANULOCYTES NFR BLD: 1.2 % (ref 0–0.5)
IRON 24H UR-MRATE: 58 MCG/DL (ref 37–170)
IRON SATN MFR SERPL: 18 % (ref 15–50)
LYMPHOCYTES # BLD AUTO: 0.39 10*3/MM3 (ref 0.6–4.2)
LYMPHOCYTES NFR BLD AUTO: 3.2 % (ref 10–50)
MAGNESIUM SERPL-MCNC: 2.4 MG/DL (ref 1.6–2.3)
MCH RBC QN AUTO: 30.9 PG (ref 26.5–34)
MCH RBC QN AUTO: 32 PG (ref 26.5–34)
MCHC RBC AUTO-ENTMCNC: 32.4 G/DL (ref 31.4–36)
MCHC RBC AUTO-ENTMCNC: 33 G/DL (ref 31.4–36)
MCV RBC AUTO: 95.4 FL (ref 80–98)
MCV RBC AUTO: 96.8 FL (ref 80–98)
MONOCYTES # BLD AUTO: 0.53 10*3/MM3 (ref 0–0.9)
MONOCYTES NFR BLD AUTO: 4.3 % (ref 0–12)
NEUTROPHILS # BLD AUTO: 11.14 10*3/MM3 (ref 2–8.6)
NEUTROPHILS NFR BLD AUTO: 91.2 % (ref 37–80)
NRBC BLD MANUAL-RTO: 0 /100 WBC (ref 0–0)
PLATELET # BLD AUTO: 32 10*3/MM3 (ref 150–450)
PLATELET # BLD AUTO: 33 10*3/MM3 (ref 150–450)
PMV BLD AUTO: 12.7 FL (ref 8–12)
PMV BLD AUTO: 13 FL (ref 8–12)
POTASSIUM BLD-SCNC: 5 MMOL/L (ref 3.5–5.1)
PROT SERPL-MCNC: 6.5 G/DL (ref 6.3–8.6)
RBC # BLD AUTO: 3.44 10*6/MM3 (ref 3.77–5.16)
RBC # BLD AUTO: 3.49 10*6/MM3 (ref 3.77–5.16)
SODIUM BLD-SCNC: 136 MMOL/L (ref 137–145)
TIBC SERPL-MCNC: 325 MCG/DL (ref 265–497)
VIT B12 BLD-MCNC: >1000 PG/ML (ref 239–931)
WBC NRBC COR # BLD: 11.79 10*3/MM3 (ref 3.2–9.8)
WBC NRBC COR # BLD: 12.22 10*3/MM3 (ref 3.2–9.8)

## 2018-06-05 PROCEDURE — 83550 IRON BINDING TEST: CPT | Performed by: INTERNAL MEDICINE

## 2018-06-05 PROCEDURE — 99233 SBSQ HOSP IP/OBS HIGH 50: CPT | Performed by: INTERNAL MEDICINE

## 2018-06-05 PROCEDURE — 82306 VITAMIN D 25 HYDROXY: CPT | Performed by: NURSE PRACTITIONER

## 2018-06-05 PROCEDURE — 86706 HEP B SURFACE ANTIBODY: CPT | Performed by: INTERNAL MEDICINE

## 2018-06-05 PROCEDURE — 63710000001 PREDNISONE PER 1 MG: Performed by: NURSE PRACTITIONER

## 2018-06-05 PROCEDURE — 87350 HEPATITIS BE AG IA: CPT | Performed by: INTERNAL MEDICINE

## 2018-06-05 PROCEDURE — 85027 COMPLETE CBC AUTOMATED: CPT | Performed by: INTERNAL MEDICINE

## 2018-06-05 PROCEDURE — 85025 COMPLETE CBC W/AUTO DIFF WBC: CPT | Performed by: INTERNAL MEDICINE

## 2018-06-05 PROCEDURE — 86704 HEP B CORE ANTIBODY TOTAL: CPT | Performed by: INTERNAL MEDICINE

## 2018-06-05 PROCEDURE — 83540 ASSAY OF IRON: CPT | Performed by: INTERNAL MEDICINE

## 2018-06-05 PROCEDURE — 82746 ASSAY OF FOLIC ACID SERUM: CPT | Performed by: INTERNAL MEDICINE

## 2018-06-05 PROCEDURE — 86707 HEPATITIS BE ANTIBODY: CPT | Performed by: INTERNAL MEDICINE

## 2018-06-05 PROCEDURE — 83735 ASSAY OF MAGNESIUM: CPT | Performed by: INTERNAL MEDICINE

## 2018-06-05 PROCEDURE — 76700 US EXAM ABDOM COMPLETE: CPT

## 2018-06-05 PROCEDURE — 82607 VITAMIN B-12: CPT | Performed by: INTERNAL MEDICINE

## 2018-06-05 PROCEDURE — 80053 COMPREHEN METABOLIC PANEL: CPT | Performed by: INTERNAL MEDICINE

## 2018-06-05 PROCEDURE — 80074 ACUTE HEPATITIS PANEL: CPT | Performed by: INTERNAL MEDICINE

## 2018-06-05 PROCEDURE — 82728 ASSAY OF FERRITIN: CPT | Performed by: INTERNAL MEDICINE

## 2018-06-05 RX ORDER — FOLIC ACID 1 MG/1
1 TABLET ORAL DAILY
Status: DISCONTINUED | OUTPATIENT
Start: 2018-06-05 | End: 2018-06-13 | Stop reason: HOSPADM

## 2018-06-06 ENCOUNTER — OUTSIDE FACILITY SERVICE (OUTPATIENT)
Dept: ONCOLOGY | Facility: CLINIC | Age: 68
End: 2018-06-06

## 2018-06-06 LAB
DEPRECATED RDW RBC AUTO: 58.6 FL (ref 36.4–46.3)
ERYTHROCYTE [DISTWIDTH] IN BLOOD BY AUTOMATED COUNT: 16.5 % (ref 11.5–14.5)
HBV CORE AB SER DONR QL IA: NEGATIVE
HBV CORE IGM SERPL QL IA: NEGATIVE
HBV E AB SERPL QL IA: NEGATIVE
HBV E AG SERPL QL IA: NEGATIVE
HBV SURFACE AB SER QL: NON REACTIVE
HBV SURFACE AG SERPL QL IA: NEGATIVE
HCT VFR BLD AUTO: 33.6 % (ref 35–45)
HCV AB S/CO SERPL IA: <0.1 S/CO RATIO (ref 0–0.9)
HGB BLD-MCNC: 11.1 G/DL (ref 12–15.5)
HOLD SPECIMEN: NORMAL
LABORATORY COMMENT REPORT: NORMAL
MCH RBC QN AUTO: 32.2 PG (ref 26.5–34)
MCHC RBC AUTO-ENTMCNC: 33 G/DL (ref 31.4–36)
MCV RBC AUTO: 97.4 FL (ref 80–98)
PLATELET # BLD AUTO: 28 10*3/MM3 (ref 150–450)
PMV BLD AUTO: 12.7 FL (ref 8–12)
RBC # BLD AUTO: 3.45 10*6/MM3 (ref 3.77–5.16)
WBC NRBC COR # BLD: 12.8 10*3/MM3 (ref 3.2–9.8)

## 2018-06-06 PROCEDURE — 99233 SBSQ HOSP IP/OBS HIGH 50: CPT | Performed by: INTERNAL MEDICINE

## 2018-06-06 PROCEDURE — 25010000002 HEPARIN LOCK FLUSH 10 UNIT/ML SOLUTION: Performed by: INTERNAL MEDICINE

## 2018-06-06 PROCEDURE — 85027 COMPLETE CBC AUTOMATED: CPT | Performed by: INTERNAL MEDICINE

## 2018-06-06 PROCEDURE — 63710000001 PREDNISONE PER 1 MG: Performed by: NURSE PRACTITIONER

## 2018-06-07 ENCOUNTER — OUTSIDE FACILITY SERVICE (OUTPATIENT)
Dept: ONCOLOGY | Facility: CLINIC | Age: 68
End: 2018-06-07

## 2018-06-07 LAB
ALBUMIN SERPL-MCNC: 2.8 G/DL (ref 3.4–4.8)
ALBUMIN/GLOB SERPL: 0.7 G/DL (ref 1.1–1.8)
ALP SERPL-CCNC: 229 U/L (ref 38–126)
ALT SERPL W P-5'-P-CCNC: 113 U/L (ref 9–52)
ANION GAP SERPL CALCULATED.3IONS-SCNC: 6 MMOL/L (ref 5–15)
AST SERPL-CCNC: 80 U/L (ref 14–36)
BASOPHILS # BLD AUTO: 0.01 10*3/MM3 (ref 0–0.2)
BASOPHILS NFR BLD AUTO: 0.1 % (ref 0–2)
BILIRUB SERPL-MCNC: 1 MG/DL (ref 0.2–1.3)
BUN BLD-MCNC: 72 MG/DL (ref 7–21)
BUN/CREAT SERPL: 44.7 (ref 7–25)
CALCIUM SPEC-SCNC: 8.4 MG/DL (ref 8.4–10.2)
CHLORIDE SERPL-SCNC: 99 MMOL/L (ref 95–110)
CO2 SERPL-SCNC: 31 MMOL/L (ref 22–31)
CREAT BLD-MCNC: 1.61 MG/DL (ref 0.5–1)
CRP SERPL-MCNC: 1.7 MG/DL (ref 0–1)
DEPRECATED RDW RBC AUTO: 56 FL (ref 36.4–46.3)
EOSINOPHIL # BLD AUTO: 0 10*3/MM3 (ref 0–0.7)
EOSINOPHIL NFR BLD AUTO: 0 % (ref 0–7)
ERYTHROCYTE [DISTWIDTH] IN BLOOD BY AUTOMATED COUNT: 16.2 % (ref 11.5–14.5)
ERYTHROCYTE [SEDIMENTATION RATE] IN BLOOD: 46 MM/HR (ref 0–20)
FOLATE SERPL-MCNC: 13.2 NG/ML (ref 2.76–21)
GFR SERPL CREATININE-BSD FRML MDRD: 32 ML/MIN/1.73 (ref 45–104)
GLOBULIN UR ELPH-MCNC: 3.8 GM/DL (ref 2.3–3.5)
GLUCOSE BLD-MCNC: 139 MG/DL (ref 60–100)
HCT VFR BLD AUTO: 33.6 % (ref 35–45)
HGB BLD-MCNC: 11 G/DL (ref 12–15.5)
IMM GRANULOCYTES # BLD: 0.16 10*3/MM3 (ref 0–0.02)
IMM GRANULOCYTES NFR BLD: 1.3 % (ref 0–0.5)
LYMPHOCYTES # BLD AUTO: 0.35 10*3/MM3 (ref 0.6–4.2)
LYMPHOCYTES NFR BLD AUTO: 2.8 % (ref 10–50)
MAGNESIUM SERPL-MCNC: 2.6 MG/DL (ref 1.6–2.3)
MCH RBC QN AUTO: 31.3 PG (ref 26.5–34)
MCHC RBC AUTO-ENTMCNC: 32.7 G/DL (ref 31.4–36)
MCV RBC AUTO: 95.5 FL (ref 80–98)
MONOCYTES # BLD AUTO: 0.73 10*3/MM3 (ref 0–0.9)
MONOCYTES NFR BLD AUTO: 5.8 % (ref 0–12)
NEUTROPHILS # BLD AUTO: 11.29 10*3/MM3 (ref 2–8.6)
NEUTROPHILS NFR BLD AUTO: 90 % (ref 37–80)
PLAT MORPH BLD: NORMAL
PLATELET # BLD AUTO: 28 10*3/MM3 (ref 150–450)
PMV BLD AUTO: ABNORMAL FL (ref 8–12)
POTASSIUM BLD-SCNC: 4.6 MMOL/L (ref 3.5–5.1)
PROT SERPL-MCNC: 6.6 G/DL (ref 6.3–8.6)
RBC # BLD AUTO: 3.52 10*6/MM3 (ref 3.77–5.16)
RBC MORPH BLD: NORMAL
SODIUM BLD-SCNC: 136 MMOL/L (ref 137–145)
VIT B12 BLD-MCNC: >1000 PG/ML (ref 239–931)
WBC MORPH BLD: NORMAL
WBC NRBC COR # BLD: 12.54 10*3/MM3 (ref 3.2–9.8)

## 2018-06-07 PROCEDURE — 25010000002 HEPARIN LOCK FLUSH 10 UNIT/ML SOLUTION: Performed by: INTERNAL MEDICINE

## 2018-06-07 PROCEDURE — 80053 COMPREHEN METABOLIC PANEL: CPT | Performed by: INTERNAL MEDICINE

## 2018-06-07 PROCEDURE — 83735 ASSAY OF MAGNESIUM: CPT | Performed by: INTERNAL MEDICINE

## 2018-06-07 PROCEDURE — 85025 COMPLETE CBC W/AUTO DIFF WBC: CPT | Performed by: INTERNAL MEDICINE

## 2018-06-07 PROCEDURE — 85651 RBC SED RATE NONAUTOMATED: CPT | Performed by: INTERNAL MEDICINE

## 2018-06-07 PROCEDURE — 85007 BL SMEAR W/DIFF WBC COUNT: CPT | Performed by: INTERNAL MEDICINE

## 2018-06-07 PROCEDURE — 82607 VITAMIN B-12: CPT | Performed by: INTERNAL MEDICINE

## 2018-06-07 PROCEDURE — 99233 SBSQ HOSP IP/OBS HIGH 50: CPT | Performed by: INTERNAL MEDICINE

## 2018-06-07 PROCEDURE — 63710000001 PREDNISONE PER 1 MG: Performed by: NURSE PRACTITIONER

## 2018-06-07 PROCEDURE — 82746 ASSAY OF FOLIC ACID SERUM: CPT | Performed by: INTERNAL MEDICINE

## 2018-06-07 PROCEDURE — 86140 C-REACTIVE PROTEIN: CPT | Performed by: INTERNAL MEDICINE

## 2018-06-07 NOTE — SIGNIFICANT NOTE
06/07/18 1610   Rehab Time/Intention   Evaluation Not Performed patient/family decline, not feeling well  (Platlets still low- recheck 6.8.18)   Rehab Treatment   Discipline physical therapist

## 2018-06-08 ENCOUNTER — OUTSIDE FACILITY SERVICE (OUTPATIENT)
Dept: ONCOLOGY | Facility: CLINIC | Age: 68
End: 2018-06-08

## 2018-06-08 LAB
ABO GROUP BLD: NORMAL
ANISOCYTOSIS BLD QL: NORMAL
BASOPHILS # BLD AUTO: 0.01 10*3/MM3 (ref 0–0.2)
BASOPHILS NFR BLD AUTO: 0.1 % (ref 0–2)
BLD GP AB SCN SERPL QL: NEGATIVE
CLUMPED PLATELETS: NORMAL
DEPRECATED RDW RBC AUTO: 55.6 FL (ref 36.4–46.3)
DEPRECATED RDW RBC AUTO: 55.6 FL (ref 36.4–46.3)
EOSINOPHIL # BLD AUTO: 0 10*3/MM3 (ref 0–0.7)
EOSINOPHIL NFR BLD AUTO: 0 % (ref 0–7)
ERYTHROCYTE [DISTWIDTH] IN BLOOD BY AUTOMATED COUNT: 15.9 % (ref 11.5–14.5)
ERYTHROCYTE [DISTWIDTH] IN BLOOD BY AUTOMATED COUNT: 16 % (ref 11.5–14.5)
HAV IGM SERPL QL IA: NEGATIVE
HBV CORE IGM SERPL QL IA: NEGATIVE
HBV SURFACE AG SERPL QL IA: NEGATIVE
HCT VFR BLD AUTO: 30.9 % (ref 35–45)
HCT VFR BLD AUTO: 31.1 % (ref 35–45)
HCV AB SER DONR QL: NEGATIVE
HGB BLD-MCNC: 10.2 G/DL (ref 12–15.5)
HGB BLD-MCNC: 10.5 G/DL (ref 12–15.5)
HOLD SPECIMEN: NORMAL
IMM GRANULOCYTES # BLD: 0.18 10*3/MM3 (ref 0–0.02)
IMM GRANULOCYTES NFR BLD: 1.5 % (ref 0–0.5)
LYMPHOCYTES # BLD AUTO: 0.23 10*3/MM3 (ref 0.6–4.2)
LYMPHOCYTES # BLD MANUAL: 0.6 10*3/MM3 (ref 0.6–4.2)
LYMPHOCYTES NFR BLD AUTO: 1.9 % (ref 10–50)
LYMPHOCYTES NFR BLD MANUAL: 5 % (ref 0–12)
LYMPHOCYTES NFR BLD MANUAL: 6 % (ref 10–50)
Lab: NORMAL
MCH RBC QN AUTO: 31.2 PG (ref 26.5–34)
MCH RBC QN AUTO: 32.1 PG (ref 26.5–34)
MCHC RBC AUTO-ENTMCNC: 33 G/DL (ref 31.4–36)
MCHC RBC AUTO-ENTMCNC: 33.8 G/DL (ref 31.4–36)
MCV RBC AUTO: 94.5 FL (ref 80–98)
MCV RBC AUTO: 95.1 FL (ref 80–98)
METAMYELOCYTES NFR BLD MANUAL: 1 % (ref 0–0)
MONOCYTES # BLD AUTO: 0.5 10*3/MM3 (ref 0–0.9)
MONOCYTES # BLD AUTO: 0.54 10*3/MM3 (ref 0–0.9)
MONOCYTES NFR BLD AUTO: 4.4 % (ref 0–12)
NEUTROPHILS # BLD AUTO: 11.39 10*3/MM3 (ref 2–8.6)
NEUTROPHILS # BLD AUTO: 8.85 10*3/MM3 (ref 2–8.6)
NEUTROPHILS NFR BLD AUTO: 92.1 % (ref 37–80)
NEUTROPHILS NFR BLD MANUAL: 88 % (ref 37–80)
NRBC BLD MANUAL-RTO: 0 /100 WBC (ref 0–0)
PLATELET # BLD AUTO: 21 10*3/MM3 (ref 150–450)
PLATELET # BLD AUTO: 49 10*3/MM3 (ref 150–450)
PMV BLD AUTO: 12.1 FL (ref 8–12)
PMV BLD AUTO: ABNORMAL FL (ref 8–12)
RBC # BLD AUTO: 3.27 10*6/MM3 (ref 3.77–5.16)
RBC # BLD AUTO: 3.27 10*6/MM3 (ref 3.77–5.16)
RBC MORPH BLD: NORMAL
RH BLD: POSITIVE
SMALL PLATELETS BLD QL SMEAR: ABNORMAL
SMALL PLATELETS BLD QL SMEAR: NORMAL
T&S EXPIRATION DATE: NORMAL
WBC MORPH BLD: NORMAL
WBC MORPH BLD: NORMAL
WBC NRBC COR # BLD: 10.06 10*3/MM3 (ref 3.2–9.8)
WBC NRBC COR # BLD: 12.35 10*3/MM3 (ref 3.2–9.8)

## 2018-06-08 PROCEDURE — 86900 BLOOD TYPING SEROLOGIC ABO: CPT

## 2018-06-08 PROCEDURE — 86850 RBC ANTIBODY SCREEN: CPT | Performed by: NURSE PRACTITIONER

## 2018-06-08 PROCEDURE — 99233 SBSQ HOSP IP/OBS HIGH 50: CPT | Performed by: INTERNAL MEDICINE

## 2018-06-08 PROCEDURE — 85007 BL SMEAR W/DIFF WBC COUNT: CPT | Performed by: INTERNAL MEDICINE

## 2018-06-08 PROCEDURE — 86901 BLOOD TYPING SEROLOGIC RH(D): CPT

## 2018-06-08 PROCEDURE — 80074 ACUTE HEPATITIS PANEL: CPT | Performed by: INTERNAL MEDICINE

## 2018-06-08 PROCEDURE — 63710000001 PREDNISONE PER 1 MG: Performed by: NURSE PRACTITIONER

## 2018-06-08 PROCEDURE — 85025 COMPLETE CBC W/AUTO DIFF WBC: CPT | Performed by: INTERNAL MEDICINE

## 2018-06-08 PROCEDURE — 86901 BLOOD TYPING SEROLOGIC RH(D): CPT | Performed by: NURSE PRACTITIONER

## 2018-06-08 PROCEDURE — P9019 PLATELETS, EACH UNIT: HCPCS

## 2018-06-08 PROCEDURE — 86900 BLOOD TYPING SEROLOGIC ABO: CPT | Performed by: NURSE PRACTITIONER

## 2018-06-08 PROCEDURE — 25010000002 HEPARIN LOCK FLUSH 10 UNIT/ML SOLUTION: Performed by: INTERNAL MEDICINE

## 2018-06-08 NOTE — SIGNIFICANT NOTE
06/08/18 1143   Rehab Time/Intention   Evaluation Not Performed other (see comments)  (platelets low; provider aware)   Rehab Treatment   Discipline physical therapist

## 2018-06-09 ENCOUNTER — OUTSIDE FACILITY SERVICE (OUTPATIENT)
Dept: ONCOLOGY | Facility: CLINIC | Age: 68
End: 2018-06-09

## 2018-06-09 LAB
ABO + RH BLD: NORMAL
ABO + RH BLD: NORMAL
ALBUMIN SERPL-MCNC: 2.6 G/DL (ref 3.4–4.8)
ALBUMIN/GLOB SERPL: 0.8 G/DL (ref 1.1–1.8)
ALP SERPL-CCNC: 174 U/L (ref 38–126)
ALT SERPL W P-5'-P-CCNC: 89 U/L (ref 9–52)
ANION GAP SERPL CALCULATED.3IONS-SCNC: 6 MMOL/L (ref 5–15)
AST SERPL-CCNC: 59 U/L (ref 14–36)
BASOPHILS # BLD AUTO: 0 10*3/MM3 (ref 0–0.2)
BASOPHILS NFR BLD AUTO: 0 % (ref 0–2)
BH BB BLOOD EXPIRATION DATE: NORMAL
BH BB BLOOD EXPIRATION DATE: NORMAL
BH BB BLOOD TYPE BARCODE: 9500
BH BB BLOOD TYPE BARCODE: NORMAL
BH BB DISPENSE STATUS: NORMAL
BH BB DISPENSE STATUS: NORMAL
BH BB PRODUCT CODE: NORMAL
BH BB PRODUCT CODE: NORMAL
BH BB UNIT NUMBER: NORMAL
BH BB UNIT NUMBER: NORMAL
BILIRUB SERPL-MCNC: 0.9 MG/DL (ref 0.2–1.3)
BUN BLD-MCNC: 74 MG/DL (ref 7–21)
BUN/CREAT SERPL: 48.1 (ref 7–25)
CALCIUM SPEC-SCNC: 8.4 MG/DL (ref 8.4–10.2)
CHLORIDE SERPL-SCNC: 97 MMOL/L (ref 95–110)
CO2 SERPL-SCNC: 33 MMOL/L (ref 22–31)
CREAT BLD-MCNC: 1.54 MG/DL (ref 0.5–1)
DEPRECATED RDW RBC AUTO: 55.4 FL (ref 36.4–46.3)
EOSINOPHIL # BLD AUTO: 0 10*3/MM3 (ref 0–0.7)
EOSINOPHIL NFR BLD AUTO: 0 % (ref 0–7)
ERYTHROCYTE [DISTWIDTH] IN BLOOD BY AUTOMATED COUNT: 16.1 % (ref 11.5–14.5)
GFR SERPL CREATININE-BSD FRML MDRD: 33 ML/MIN/1.73 (ref 45–104)
GLOBULIN UR ELPH-MCNC: 3.4 GM/DL (ref 2.3–3.5)
GLUCOSE BLD-MCNC: 138 MG/DL (ref 60–100)
HCT VFR BLD AUTO: 29.5 % (ref 35–45)
HGB BLD-MCNC: 9.9 G/DL (ref 12–15.5)
IMM GRANULOCYTES # BLD: 0.11 10*3/MM3 (ref 0–0.02)
IMM GRANULOCYTES NFR BLD: 1.1 % (ref 0–0.5)
LYMPHOCYTES # BLD AUTO: 0.38 10*3/MM3 (ref 0.6–4.2)
LYMPHOCYTES NFR BLD AUTO: 4 % (ref 10–50)
MAGNESIUM SERPL-MCNC: 2.4 MG/DL (ref 1.6–2.3)
MCH RBC QN AUTO: 31.7 PG (ref 26.5–34)
MCHC RBC AUTO-ENTMCNC: 33.6 G/DL (ref 31.4–36)
MCV RBC AUTO: 94.6 FL (ref 80–98)
MONOCYTES # BLD AUTO: 0.63 10*3/MM3 (ref 0–0.9)
MONOCYTES NFR BLD AUTO: 6.6 % (ref 0–12)
NEUTROPHILS # BLD AUTO: 8.48 10*3/MM3 (ref 2–8.6)
NEUTROPHILS NFR BLD AUTO: 88.3 % (ref 37–80)
PLATELET # BLD AUTO: 34 10*3/MM3 (ref 150–450)
PMV BLD AUTO: 11.6 FL (ref 8–12)
POTASSIUM BLD-SCNC: 4.5 MMOL/L (ref 3.5–5.1)
PROT SERPL-MCNC: 6 G/DL (ref 6.3–8.6)
RBC # BLD AUTO: 3.12 10*6/MM3 (ref 3.77–5.16)
SODIUM BLD-SCNC: 136 MMOL/L (ref 137–145)
UNIT  ABO: NORMAL
UNIT  ABO: NORMAL
UNIT  RH: NORMAL
UNIT  RH: NORMAL
WBC NRBC COR # BLD: 9.6 10*3/MM3 (ref 3.2–9.8)

## 2018-06-09 PROCEDURE — 99233 SBSQ HOSP IP/OBS HIGH 50: CPT | Performed by: INTERNAL MEDICINE

## 2018-06-09 PROCEDURE — 83735 ASSAY OF MAGNESIUM: CPT | Performed by: INTERNAL MEDICINE

## 2018-06-09 PROCEDURE — 80053 COMPREHEN METABOLIC PANEL: CPT | Performed by: INTERNAL MEDICINE

## 2018-06-09 PROCEDURE — 25010000002 FUROSEMIDE PER 20 MG: Performed by: INTERNAL MEDICINE

## 2018-06-09 PROCEDURE — 63710000001 PREDNISONE PER 1 MG: Performed by: NURSE PRACTITIONER

## 2018-06-09 PROCEDURE — 85025 COMPLETE CBC W/AUTO DIFF WBC: CPT | Performed by: NURSE PRACTITIONER

## 2018-06-09 PROCEDURE — 25010000002 HEPARIN LOCK FLUSH 10 UNIT/ML SOLUTION: Performed by: INTERNAL MEDICINE

## 2018-06-09 RX ORDER — FUROSEMIDE 10 MG/ML
40 INJECTION INTRAMUSCULAR; INTRAVENOUS EVERY 6 HOURS
Status: DISPENSED | OUTPATIENT
Start: 2018-06-09 | End: 2018-06-11

## 2018-06-09 NOTE — SIGNIFICANT NOTE
06/09/18 1019   Rehab Time/Intention   Evaluation Not Performed other (see comments)  (Pt platelets continue to drop and be outside therapeutic range. Will d/c OT at this time; please re-consult when platelets are stable and pt medically appropriate for therapy services. )   Rehab Treatment   Discipline occupational therapist

## 2018-06-10 ENCOUNTER — OUTSIDE FACILITY SERVICE (OUTPATIENT)
Dept: ONCOLOGY | Facility: CLINIC | Age: 68
End: 2018-06-10

## 2018-06-10 LAB
ANION GAP SERPL CALCULATED.3IONS-SCNC: 5 MMOL/L (ref 5–15)
BASOPHILS # BLD AUTO: 0 10*3/MM3 (ref 0–0.2)
BASOPHILS NFR BLD AUTO: 0 % (ref 0–2)
BUN BLD-MCNC: 79 MG/DL (ref 7–21)
BUN/CREAT SERPL: 49.4 (ref 7–25)
CALCIUM SPEC-SCNC: 8.4 MG/DL (ref 8.4–10.2)
CHLORIDE SERPL-SCNC: 95 MMOL/L (ref 95–110)
CO2 SERPL-SCNC: 32 MMOL/L (ref 22–31)
CREAT BLD-MCNC: 1.6 MG/DL (ref 0.5–1)
DEPRECATED RDW RBC AUTO: 54.3 FL (ref 36.4–46.3)
EOSINOPHIL # BLD AUTO: 0 10*3/MM3 (ref 0–0.7)
EOSINOPHIL NFR BLD AUTO: 0 % (ref 0–7)
ERYTHROCYTE [DISTWIDTH] IN BLOOD BY AUTOMATED COUNT: 15.9 % (ref 11.5–14.5)
GFR SERPL CREATININE-BSD FRML MDRD: 32 ML/MIN/1.73 (ref 45–104)
GLUCOSE BLD-MCNC: 124 MG/DL (ref 60–100)
HCT VFR BLD AUTO: 31.1 % (ref 35–45)
HCT VFR BLD AUTO: 31.1 % (ref 35–45)
HGB BLD-MCNC: 10.4 G/DL (ref 12–15.5)
HGB BLD-MCNC: 10.4 G/DL (ref 12–15.5)
IMM GRANULOCYTES # BLD: 0.11 10*3/MM3 (ref 0–0.02)
IMM GRANULOCYTES NFR BLD: 1.3 % (ref 0–0.5)
LYMPHOCYTES # BLD AUTO: 0.33 10*3/MM3 (ref 0.6–4.2)
LYMPHOCYTES NFR BLD AUTO: 3.8 % (ref 10–50)
MCH RBC QN AUTO: 31.3 PG (ref 26.5–34)
MCHC RBC AUTO-ENTMCNC: 33.4 G/DL (ref 31.4–36)
MCV RBC AUTO: 93.7 FL (ref 80–98)
MONOCYTES # BLD AUTO: 0.54 10*3/MM3 (ref 0–0.9)
MONOCYTES NFR BLD AUTO: 6.3 % (ref 0–12)
NEUTROPHILS # BLD AUTO: 7.61 10*3/MM3 (ref 2–8.6)
NEUTROPHILS NFR BLD AUTO: 88.6 % (ref 37–80)
PLATELET # BLD AUTO: 30 10*3/MM3 (ref 150–450)
PMV BLD AUTO: 11.9 FL (ref 8–12)
POTASSIUM BLD-SCNC: 5 MMOL/L (ref 3.5–5.1)
RBC # BLD AUTO: 3.32 10*6/MM3 (ref 3.77–5.16)
SODIUM BLD-SCNC: 132 MMOL/L (ref 137–145)
WBC NRBC COR # BLD: 8.59 10*3/MM3 (ref 3.2–9.8)

## 2018-06-10 PROCEDURE — 80048 BASIC METABOLIC PNL TOTAL CA: CPT | Performed by: INTERNAL MEDICINE

## 2018-06-10 PROCEDURE — 85014 HEMATOCRIT: CPT | Performed by: INTERNAL MEDICINE

## 2018-06-10 PROCEDURE — 99233 SBSQ HOSP IP/OBS HIGH 50: CPT | Performed by: INTERNAL MEDICINE

## 2018-06-10 PROCEDURE — 63710000001 PREDNISONE PER 1 MG: Performed by: NURSE PRACTITIONER

## 2018-06-10 PROCEDURE — 25010000002 FUROSEMIDE PER 20 MG: Performed by: INTERNAL MEDICINE

## 2018-06-10 PROCEDURE — 85025 COMPLETE CBC W/AUTO DIFF WBC: CPT | Performed by: INTERNAL MEDICINE

## 2018-06-10 PROCEDURE — 85018 HEMOGLOBIN: CPT | Performed by: INTERNAL MEDICINE

## 2018-06-10 PROCEDURE — 25010000002 HEPARIN LOCK FLUSH 10 UNIT/ML SOLUTION: Performed by: INTERNAL MEDICINE

## 2018-06-11 ENCOUNTER — OUTSIDE FACILITY SERVICE (OUTPATIENT)
Dept: ONCOLOGY | Facility: CLINIC | Age: 68
End: 2018-06-11

## 2018-06-11 LAB
ALBUMIN SERPL-MCNC: 2.8 G/DL (ref 3.4–4.8)
ALBUMIN/GLOB SERPL: 0.8 G/DL (ref 1.1–1.8)
ALP SERPL-CCNC: 213 U/L (ref 38–126)
ALT SERPL W P-5'-P-CCNC: 101 U/L (ref 9–52)
ANION GAP SERPL CALCULATED.3IONS-SCNC: 7 MMOL/L (ref 5–15)
AST SERPL-CCNC: 62 U/L (ref 14–36)
BASOPHILS # BLD AUTO: 0.01 10*3/MM3 (ref 0–0.2)
BASOPHILS NFR BLD AUTO: 0.1 % (ref 0–2)
BILIRUB SERPL-MCNC: 1.2 MG/DL (ref 0.2–1.3)
BUN BLD-MCNC: 80 MG/DL (ref 7–21)
BUN/CREAT SERPL: 45.7 (ref 7–25)
CALCIUM SPEC-SCNC: 8.7 MG/DL (ref 8.4–10.2)
CHLORIDE SERPL-SCNC: 95 MMOL/L (ref 95–110)
CO2 SERPL-SCNC: 33 MMOL/L (ref 22–31)
CREAT BLD-MCNC: 1.75 MG/DL (ref 0.5–1)
DEPRECATED RDW RBC AUTO: 56.4 FL (ref 36.4–46.3)
EOSINOPHIL # BLD AUTO: 0 10*3/MM3 (ref 0–0.7)
EOSINOPHIL NFR BLD AUTO: 0 % (ref 0–7)
ERYTHROCYTE [DISTWIDTH] IN BLOOD BY AUTOMATED COUNT: 16.4 % (ref 11.5–14.5)
GFR SERPL CREATININE-BSD FRML MDRD: 29 ML/MIN/1.73 (ref 45–104)
GLOBULIN UR ELPH-MCNC: 3.6 GM/DL (ref 2.3–3.5)
GLUCOSE BLD-MCNC: 133 MG/DL (ref 60–100)
HCT VFR BLD AUTO: 32.7 % (ref 35–45)
HGB BLD-MCNC: 11 G/DL (ref 12–15.5)
IMM GRANULOCYTES # BLD: 0.16 10*3/MM3 (ref 0–0.02)
IMM GRANULOCYTES NFR BLD: 1.2 % (ref 0–0.5)
LYMPHOCYTES # BLD AUTO: 0.5 10*3/MM3 (ref 0.6–4.2)
LYMPHOCYTES NFR BLD AUTO: 3.8 % (ref 10–50)
MAGNESIUM SERPL-MCNC: 2.2 MG/DL (ref 1.6–2.3)
MCH RBC QN AUTO: 31.7 PG (ref 26.5–34)
MCHC RBC AUTO-ENTMCNC: 33.6 G/DL (ref 31.4–36)
MCV RBC AUTO: 94.2 FL (ref 80–98)
MONOCYTES # BLD AUTO: 0.7 10*3/MM3 (ref 0–0.9)
MONOCYTES NFR BLD AUTO: 5.4 % (ref 0–12)
NEUTROPHILS # BLD AUTO: 11.63 10*3/MM3 (ref 2–8.6)
NEUTROPHILS NFR BLD AUTO: 89.5 % (ref 37–80)
PLATELET # BLD AUTO: 32 10*3/MM3 (ref 150–450)
PMV BLD AUTO: 12.1 FL (ref 8–12)
POTASSIUM BLD-SCNC: 4.9 MMOL/L (ref 3.5–5.1)
POTASSIUM BLD-SCNC: 5.3 MMOL/L (ref 3.5–5.1)
PROT SERPL-MCNC: 6.4 G/DL (ref 6.3–8.6)
RBC # BLD AUTO: 3.47 10*6/MM3 (ref 3.77–5.16)
SODIUM BLD-SCNC: 135 MMOL/L (ref 137–145)
WBC NRBC COR # BLD: 13 10*3/MM3 (ref 3.2–9.8)

## 2018-06-11 PROCEDURE — 84132 ASSAY OF SERUM POTASSIUM: CPT | Performed by: NURSE PRACTITIONER

## 2018-06-11 PROCEDURE — 83735 ASSAY OF MAGNESIUM: CPT | Performed by: INTERNAL MEDICINE

## 2018-06-11 PROCEDURE — 85025 COMPLETE CBC W/AUTO DIFF WBC: CPT | Performed by: INTERNAL MEDICINE

## 2018-06-11 PROCEDURE — 25010000002 HEPARIN LOCK FLUSH 10 UNIT/ML SOLUTION: Performed by: INTERNAL MEDICINE

## 2018-06-11 PROCEDURE — 99233 SBSQ HOSP IP/OBS HIGH 50: CPT | Performed by: INTERNAL MEDICINE

## 2018-06-11 PROCEDURE — 80053 COMPREHEN METABOLIC PANEL: CPT | Performed by: INTERNAL MEDICINE

## 2018-06-11 PROCEDURE — 25010000002 FUROSEMIDE PER 20 MG: Performed by: INTERNAL MEDICINE

## 2018-06-11 PROCEDURE — 63710000001 PREDNISONE PER 1 MG: Performed by: NURSE PRACTITIONER

## 2018-06-12 ENCOUNTER — OUTSIDE FACILITY SERVICE (OUTPATIENT)
Dept: ONCOLOGY | Facility: CLINIC | Age: 68
End: 2018-06-12

## 2018-06-12 VITALS — HEART RATE: 63 BPM

## 2018-06-12 LAB
ANION GAP SERPL CALCULATED.3IONS-SCNC: 6 MMOL/L (ref 5–15)
ANISOCYTOSIS BLD QL: NORMAL
BASOPHILS # BLD AUTO: 0.01 10*3/MM3 (ref 0–0.2)
BASOPHILS NFR BLD AUTO: 0.1 % (ref 0–2)
BUN BLD-MCNC: 84 MG/DL (ref 7–21)
BUN/CREAT SERPL: 48 (ref 7–25)
CALCIUM SPEC-SCNC: 8.5 MG/DL (ref 8.4–10.2)
CHLORIDE SERPL-SCNC: 95 MMOL/L (ref 95–110)
CO2 SERPL-SCNC: 34 MMOL/L (ref 22–31)
CREAT BLD-MCNC: 1.75 MG/DL (ref 0.5–1)
DEPRECATED RDW RBC AUTO: 55.5 FL (ref 36.4–46.3)
DEPRECATED RDW RBC AUTO: 57.2 FL (ref 36.4–46.3)
EOSINOPHIL # BLD AUTO: 0.02 10*3/MM3 (ref 0–0.7)
EOSINOPHIL NFR BLD AUTO: 0.2 % (ref 0–7)
ERYTHROCYTE [DISTWIDTH] IN BLOOD BY AUTOMATED COUNT: 16.2 % (ref 11.5–14.5)
ERYTHROCYTE [DISTWIDTH] IN BLOOD BY AUTOMATED COUNT: 16.5 % (ref 11.5–14.5)
GFR SERPL CREATININE-BSD FRML MDRD: 29 ML/MIN/1.73 (ref 45–104)
GLUCOSE BLD-MCNC: 143 MG/DL (ref 60–100)
HCT VFR BLD AUTO: 30.2 % (ref 35–45)
HCT VFR BLD AUTO: 30.6 % (ref 35–45)
HGB BLD-MCNC: 10.2 G/DL (ref 12–15.5)
HGB BLD-MCNC: 10.3 G/DL (ref 12–15.5)
IMM GRANULOCYTES # BLD: 0.12 10*3/MM3 (ref 0–0.02)
IMM GRANULOCYTES NFR BLD: 1.2 % (ref 0–0.5)
LYMPHOCYTES # BLD AUTO: 0.56 10*3/MM3 (ref 0.6–4.2)
LYMPHOCYTES NFR BLD AUTO: 5.8 % (ref 10–50)
MCH RBC QN AUTO: 31.7 PG (ref 26.5–34)
MCH RBC QN AUTO: 32.1 PG (ref 26.5–34)
MCHC RBC AUTO-ENTMCNC: 33.7 G/DL (ref 31.4–36)
MCHC RBC AUTO-ENTMCNC: 33.8 G/DL (ref 31.4–36)
MCV RBC AUTO: 93.8 FL (ref 80–98)
MCV RBC AUTO: 95.3 FL (ref 80–98)
MONOCYTES # BLD AUTO: 0.69 10*3/MM3 (ref 0–0.9)
MONOCYTES NFR BLD AUTO: 7.1 % (ref 0–12)
NEUTROPHILS # BLD AUTO: 8.3 10*3/MM3 (ref 2–8.6)
NEUTROPHILS NFR BLD AUTO: 85.6 % (ref 37–80)
NRBC BLD MANUAL-RTO: 0 /100 WBC (ref 0–0)
PLATELET # BLD AUTO: 26 10*3/MM3 (ref 150–450)
PLATELET # BLD AUTO: 50 10*3/MM3 (ref 150–450)
PMV BLD AUTO: 11.9 FL (ref 8–12)
PMV BLD AUTO: ABNORMAL FL (ref 8–12)
POTASSIUM BLD-SCNC: 4.6 MMOL/L (ref 3.5–5.1)
RBC # BLD AUTO: 3.21 10*6/MM3 (ref 3.77–5.16)
RBC # BLD AUTO: 3.22 10*6/MM3 (ref 3.77–5.16)
SMALL PLATELETS BLD QL SMEAR: NORMAL
SODIUM BLD-SCNC: 135 MMOL/L (ref 137–145)
WBC MORPH BLD: NORMAL
WBC NRBC COR # BLD: 9.54 10*3/MM3 (ref 3.2–9.8)
WBC NRBC COR # BLD: 9.7 10*3/MM3 (ref 3.2–9.8)

## 2018-06-12 PROCEDURE — 85007 BL SMEAR W/DIFF WBC COUNT: CPT | Performed by: INTERNAL MEDICINE

## 2018-06-12 PROCEDURE — 63710000001 PREDNISONE PER 1 MG: Performed by: NURSE PRACTITIONER

## 2018-06-12 PROCEDURE — P9037 PLATE PHERES LEUKOREDU IRRAD: HCPCS

## 2018-06-12 PROCEDURE — 25010000002 HEPARIN LOCK FLUSH 10 UNIT/ML SOLUTION: Performed by: INTERNAL MEDICINE

## 2018-06-12 PROCEDURE — 85025 COMPLETE CBC W/AUTO DIFF WBC: CPT | Performed by: INTERNAL MEDICINE

## 2018-06-12 PROCEDURE — 99233 SBSQ HOSP IP/OBS HIGH 50: CPT | Performed by: INTERNAL MEDICINE

## 2018-06-12 PROCEDURE — 85027 COMPLETE CBC AUTOMATED: CPT | Performed by: INTERNAL MEDICINE

## 2018-06-12 PROCEDURE — 80048 BASIC METABOLIC PNL TOTAL CA: CPT | Performed by: INTERNAL MEDICINE

## 2018-06-12 RX ORDER — FUROSEMIDE 10 MG/ML
20 INJECTION INTRAMUSCULAR; INTRAVENOUS ONCE
Status: DISCONTINUED | OUTPATIENT
Start: 2018-06-12 | End: 2018-06-13 | Stop reason: HOSPADM

## 2018-06-13 LAB
ABO + RH BLD: NORMAL
BH BB BLOOD EXPIRATION DATE: NORMAL
BH BB BLOOD TYPE BARCODE: 6200
BH BB DISPENSE STATUS: NORMAL
BH BB PRODUCT CODE: NORMAL
BH BB UNIT NUMBER: NORMAL
UNIT  ABO: NORMAL
UNIT  RH: NORMAL

## 2018-06-19 ENCOUNTER — APPOINTMENT (OUTPATIENT)
Dept: ONCOLOGY | Facility: CLINIC | Age: 68
End: 2018-06-19

## 2018-06-19 ENCOUNTER — APPOINTMENT (OUTPATIENT)
Dept: ONCOLOGY | Facility: HOSPITAL | Age: 68
End: 2018-06-19